# Patient Record
Sex: MALE | Race: WHITE | NOT HISPANIC OR LATINO | Employment: OTHER | URBAN - METROPOLITAN AREA
[De-identification: names, ages, dates, MRNs, and addresses within clinical notes are randomized per-mention and may not be internally consistent; named-entity substitution may affect disease eponyms.]

---

## 2017-11-08 PROBLEM — F31.70 BIPOLAR DISORDER IN FULL REMISSION (HCC): Status: ACTIVE | Noted: 2017-11-08

## 2017-11-08 PROBLEM — I10 ESSENTIAL HYPERTENSION: Status: ACTIVE | Noted: 2017-11-08

## 2017-11-08 PROBLEM — F19.11 HX OF SUBSTANCE ABUSE (HCC): Status: ACTIVE | Noted: 2017-11-08

## 2017-11-08 PROBLEM — E29.1 HYPOGONADISM IN MALE: Status: ACTIVE | Noted: 2017-11-08

## 2018-09-24 ENCOUNTER — HOSPITAL ENCOUNTER (EMERGENCY)
Facility: MEDICAL CENTER | Age: 38
End: 2018-09-24
Attending: EMERGENCY MEDICINE
Payer: MEDICARE

## 2018-09-24 VITALS
RESPIRATION RATE: 16 BRPM | TEMPERATURE: 99.1 F | SYSTOLIC BLOOD PRESSURE: 138 MMHG | DIASTOLIC BLOOD PRESSURE: 84 MMHG | HEART RATE: 99 BPM | HEIGHT: 72 IN | WEIGHT: 187.39 LBS | BODY MASS INDEX: 25.38 KG/M2 | OXYGEN SATURATION: 95 %

## 2018-09-24 DIAGNOSIS — F10.20 ALCOHOLISM (HCC): ICD-10-CM

## 2018-09-24 DIAGNOSIS — F10.930 ALCOHOL WITHDRAWAL SYNDROME WITHOUT COMPLICATION (HCC): ICD-10-CM

## 2018-09-24 PROCEDURE — 700102 HCHG RX REV CODE 250 W/ 637 OVERRIDE(OP): Performed by: EMERGENCY MEDICINE

## 2018-09-24 PROCEDURE — 99284 EMERGENCY DEPT VISIT MOD MDM: CPT

## 2018-09-24 PROCEDURE — A9270 NON-COVERED ITEM OR SERVICE: HCPCS | Performed by: EMERGENCY MEDICINE

## 2018-09-24 RX ORDER — DIAZEPAM 5 MG/1
5 TABLET ORAL ONCE
Status: COMPLETED | OUTPATIENT
Start: 2018-09-24 | End: 2018-09-24

## 2018-09-24 RX ADMIN — DIAZEPAM 5 MG: 5 TABLET ORAL at 15:56

## 2018-09-24 ASSESSMENT — PAIN SCALES - GENERAL
PAINLEVEL_OUTOF10: 0
PAINLEVEL_OUTOF10: 0

## 2018-09-24 NOTE — ED NOTES
Patient care assumed for discharge only.  Pt given d/c instructions and f/u info with verbal understanding.  Pt has [facility] release form in hand and is going to go straight there.  VSS at discharge.  Pt ambulatory from the ED w/ steady gait.  All belongings in possession on discharge.  Pt escorted to the lobby by RN.  '

## 2018-09-24 NOTE — ED PROVIDER NOTES
ED Provider Note    Scribed for Con Herrera M.D. by Doreen Bartlett. 9/24/2018, 3:33 PM.    Primary care provider: Denny Booth P.A.-C.  Means of arrival: Walk-In  History obtained from: Patient  History limited by: None    CHIEF COMPLAINT  Chief Complaint   Patient presents with   • Medical Clearance     Wants to go to [facility]  Ramos Espinosa is a 38 y.o. male who presents to the Emergency Department for medical clearance to detox after testing positive for Benzo's today while at [facility]. Patient was recently admitted to [facility] for 2 weeks and recently discharged on 9/21 after an ICU admission for alcohol related seizure. Patient states he was in a coma for a week and in the ICU for another week following that and discharged on Friday. Associated symptoms include slight tremor since waking up from his Coma.     REVIEW OF SYSTEMS  Pertinent positives include tremor, medical clearance.   Pertinent negatives include no fever, nausea, emesis.       PAST MEDICAL HISTORY   has a past medical history of Allergy; C. difficile diarrhea (6/2006); Hypertension; Opiate dependence (CMS-HCC) ([facility]); and Seizure disorder (CMS-HCC) ([facility]).    SURGICAL HISTORY   has a past surgical history that includes laminotomy (2006).    SOCIAL HISTORY  Social History   Substance Use Topics   • Smoking status: Current Every Day Smoker     Packs/day: 0.50     Types: Cigarettes   • Smokeless tobacco: Never Used   • Alcohol use No      Comment: Denies      History   Drug Use No     Comment: Denies       FAMILY HISTORY  No family history on file.    CURRENT MEDICATIONS  No current facility-administered medications for this encounter.     Current Outpatient Prescriptions:   •  amLODIPine (NORVASC) 10 MG Tab, TAKE 1 TABLET BY MOUTH EVERY DAY, Disp: 90 Tab, Rfl: 1  •  lisinopril (PRINIVIL, ZESTRIL) 40 MG tablet, TAKE 1 TAB BY MOUTH EVERY DAY., Disp: 90 Tab, Rfl: 1  •  clotrimazole-betamethasone (LOTRISONE) 1-0.05  "% Cream, Apply small amount to affected area twice daily, Disp: 30 g, Rfl: 0  •  gabapentin (NEURONTIN) 800 MG tablet, Take 1 Tab by mouth 3 times a day., Disp: 90 Tab, Rfl: 0  •  doxepin (SINEQUAN) 150 MG capsule, Take 1 capsule every evening, Disp: 30 Cap, Rfl: 0  •  B-D 3CC LUER-BRITTANEY SYR 38HK0-5/2 18G X 1-1/2\" 3 ML Misc, USE SYRINGE TO DRAW UP TESTOSTERONE SUSPENSION, Disp: 10 Each, Rfl: 3  •  hydrOXYzine pamoate (VISTARIL) 50 MG Cap, Take 1 capsule 3 times daily, Disp: 90 Cap, Rfl: 3  •  metoprolol SR (TOPROL XL) 100 MG TABLET SR 24 HR, Take 1 tablet daily, Disp: 90 Tab, Rfl: 3  •  NEXIUM 40 MG delayed-release capsule, , Disp: , Rfl:     ALLERGIES  No Known Allergies    PHYSICAL EXAM  VITAL SIGNS: /91   Pulse 98   Temp 37.1 °C (98.7 °F)   Resp 16   Ht 1.829 m (6')   Wt 85 kg (187 lb 6.3 oz)   SpO2 96%   BMI 25.41 kg/m²     Nursing note and vitals reviewed.  Constitutional: Well-developed and well-nourished. No distress. Slightly anxious.   HENT: Head is normocephalic and atraumatic. Oropharynx is clear and moist without exudate or erythema.   Eyes: Pupils are equal, round, and reactive to light. Conjunctiva are normal.   Cardiovascular: Normal rate and regular rhythm. No murmur heard. Normal radial pulses.  Pulmonary/Chest: Breath sounds normal. No wheezes or rales.   Abdominal: Soft and non-tender. No distention    Musculoskeletal: Extremities exhibit normal range of motion without edema or tenderness.   Neurological: Very fine tremor. Awake, alert and oriented to person, place, and time. No focal deficits noted.  Skin: Skin is warm and dry. No rash.   Psychiatric: Normal mood and slightly anxious affect. Appropriate for clinical situation    DIAGNOSTIC STUDIES / PROCEDURES    COURSE & MEDICAL DECISION MAKING  Nursing notes, VS, PMSFHx reviewed in chart.    3:33 PM - Patient seen and examined at bedside. Patient will be treated with Valium.      The patient appears to have a mild alcohol withdrawal " syndrome.  No evidence of significant complication at this time.  He is medically clear for treatment at [facility].    HTN/IDDM FOLLOW UP:  The patient has known hypertension and is being followed by their primary care doctor    DISPOSITION:  Patient will be discharged home in stable condition.    FOLLOW UP:  [facility], Emergency Dept  [address] [id number] [phone]    If symptoms worsen    [facility]    Today        OUTPATIENT MEDICATIONS:  New Prescriptions    No medications on file       The patient was discharged home with an information sheet on alcoholism and told to return immediately for any signs or symptoms listed. The patient agreed to the discharge precautions and follow-up plan which is documented in EPIC.    FINAL IMPRESSION  1. Alcoholism (HCC)    2. Alcohol withdrawal syndrome without complication (HCC)          Doreen BOND (Marinibe), am scribing for, and in the presence of, Con Herrera M.D..    Electronically signed by: Doreen Bartlett (Neto), 9/24/2018    ICon M.D. personally performed the services described in this documentation, as scribed by Doreen Bartlett in my presence, and it is both accurate and complete. E    The note accurately reflects work and decisions made by me.  Con Herrera  9/24/2018  3:48 PM     <<-----Click on this checkbox to enter Family History Family history of diabetes mellitus in paternal grandmother     Father  Still living? Unknown  Family history of hypertension, Age at diagnosis: Age Unknown     Mother  Still living? Yes, Estimated age: Age Unknown  Family history of hypertension, Age at diagnosis: Age Unknown     Grandparent  Still living? Unknown  Family history of hypertension in grandmother, Age at diagnosis: Age Unknown     Aunt  Still living? Unknown  Family history of diabetes mellitus (DM), Age at diagnosis: Age Unknown     Uncle  Still living? Yes, Estimated age: Age Unknown  Family history of diabetes mellitus (DM), Age at diagnosis: Age Unknown

## 2018-09-24 NOTE — ED TRIAGE NOTES
Pt amb to triage.   Chief Complaint   Patient presents with   • Medical Clearance     Wants to go to [facility]     Pt states he was DC'd from [facility] fri 9/21 after an ICU admit for etoh related seizure. Pt presented to [facility] today, tested positive for benzo's, needs doctor clearance that he can detox.    Pt calm and cooperative.

## 2018-10-03 ENCOUNTER — HOSPITAL ENCOUNTER (EMERGENCY)
Facility: MEDICAL CENTER | Age: 38
End: 2018-10-03
Attending: EMERGENCY MEDICINE
Payer: MEDICARE

## 2018-10-03 VITALS
RESPIRATION RATE: 16 BRPM | TEMPERATURE: 97.6 F | SYSTOLIC BLOOD PRESSURE: 125 MMHG | OXYGEN SATURATION: 96 % | HEART RATE: 89 BPM | DIASTOLIC BLOOD PRESSURE: 86 MMHG | WEIGHT: 197 LBS | BODY MASS INDEX: 26.72 KG/M2

## 2018-10-03 DIAGNOSIS — Z76.0 MEDICATION REFILL: ICD-10-CM

## 2018-10-03 DIAGNOSIS — F41.9 ANXIETY: ICD-10-CM

## 2018-10-03 DIAGNOSIS — F10.930 ALCOHOL WITHDRAWAL SYNDROME WITHOUT COMPLICATION (HCC): ICD-10-CM

## 2018-10-03 PROCEDURE — A9270 NON-COVERED ITEM OR SERVICE: HCPCS | Performed by: EMERGENCY MEDICINE

## 2018-10-03 PROCEDURE — 700102 HCHG RX REV CODE 250 W/ 637 OVERRIDE(OP): Performed by: EMERGENCY MEDICINE

## 2018-10-03 PROCEDURE — 99283 EMERGENCY DEPT VISIT LOW MDM: CPT

## 2018-10-03 RX ORDER — ALPRAZOLAM 0.5 MG/1
0.5 TABLET ORAL 3 TIMES DAILY PRN
Qty: 15 TAB | Refills: 0 | Status: SHIPPED | OUTPATIENT
Start: 2018-10-03 | End: 2018-10-08

## 2018-10-03 RX ORDER — ALPRAZOLAM 0.25 MG/1
0.5 TABLET ORAL ONCE
Status: COMPLETED | OUTPATIENT
Start: 2018-10-03 | End: 2018-10-03

## 2018-10-03 RX ADMIN — ALPRAZOLAM 0.5 MG: 0.25 TABLET ORAL at 13:22

## 2018-10-03 ASSESSMENT — PAIN SCALES - GENERAL: PAINLEVEL_OUTOF10: 7

## 2018-10-03 NOTE — ED NOTES
Medicated Pt per MAR. Discharge instructions given to patient, prescriptions provided, a verbal understanding of all instructions was stated.  Pt preferred to walk out accompanied by self VSS,  all belongings accounted for.

## 2018-10-03 NOTE — ED PROVIDER NOTES
"ED Provider Note    CHIEF COMPLAINT  Chief Complaint   Patient presents with   • Medication Refill       HPI  Ramos Espinosa is a 38 y.o. male who presents to the emergency department for medication refill and persistent anxiety.  Patient has a history of alcoholism.  Currently at [facility] for ongoing therapy.  He explains that he was admitted to [facility] earlier in the month for alcohol withdrawal seizure.  He explains that he is since been taken off his benzodiazepine and he is feeling persistently anxious.  He explains that he is currently not getting any medication for his \"withdrawal \".  He also notes that he does have a follow-up appoint with his primary care physician however this is not until December.  He is currently getting the rest of his medications without difficulty.    REVIEW OF SYSTEMS  See HPI for further details. All other systems are negative.     PAST MEDICAL HISTORY   has a past medical history of Allergy; C. difficile diarrhea (6/2006); Hypertension; Opiate dependence ([facility]); and Seizure disorder ([facility]).    SOCIAL HISTORY  Social History     Social History Main Topics   • Smoking status: Current Every Day Smoker     Packs/day: 0.50     Types: Cigarettes   • Smokeless tobacco: Never Used   • Alcohol use No      Comment: Denies   • Drug use: No      Comment: Denies   • Sexual activity: Not on file       SURGICAL HISTORY   has a past surgical history that includes laminotomy (2006).    CURRENT MEDICATIONS  Home Medications     Reviewed by Marcelle Martinez R.N. (Registered Nurse) on 10/03/18 at 1250  Med List Status: Partial   Medication Last Dose Status   amLODIPine (NORVASC) 10 MG Tab  Active   B-D 3CC LUER-BRITTANEY SYR 13YE0-7/2 18G X 1-1/2\" 3 ML Misc 3/20/2018 Active   clotrimazole-betamethasone (LOTRISONE) 1-0.05 % Cream  Active   doxepin (SINEQUAN) 150 MG capsule  Active   gabapentin (NEURONTIN) 800 MG tablet 10/3/2018 Active   hydrOXYzine pamoate (VISTARIL) 50 MG Cap  Active "   lisinopril (PRINIVIL, ZESTRIL) 40 MG tablet  Active   metoprolol SR (TOPROL XL) 100 MG TABLET SR 24 HR  Active   NEXIUM 40 MG delayed-release capsule  Active                ALLERGIES  No Known Allergies    PHYSICAL EXAM  VITAL SIGNS: /86   Pulse 89   Temp 36.4 °C (97.6 °F) (Temporal)   Resp 16   Wt 89.4 kg (197 lb)   SpO2 96%   BMI 26.72 kg/m²  @AMARILYS[[id number]::@  Pulse ox interpretation: I interpret this pulse ox as normal.  Constitutional: Alert in no apparent distress.  HENT: Normocephalic, Atraumatic, Bilateral external ears normal. Nose normal.   Eyes: Pupils are equal and reactive. Conjunctiva normal, non-icteric.   Heart: Regular rate and rythm, no murmurs.    Lungs: Clear to auscultation bilaterally.  Skin: Warm, Dry, No erythema, No rash.   Neurologic: Alert, Grossly non-focal.   Psychiatric: Judgment normal, anxious, Appears appropriate and not intoxicated.           COURSE & MEDICAL DECISION MAKING  Pertinent Labs & Imaging studies reviewed. (See chart for details)  Patient presented to the emergency department for medication refill.  Patient has persistent anxiety and is currently undergoing alcohol withdrawal therapy.  He does not have any benzodiazepines ordered.  Sent from [facility] for further medication refill.  Previously on Xanax.  I will refill this for the next 5 days.  He is referred to primary care physician as well as local clinic for outpatient follow-up and reevaluation.    The patient will not drink alcohol nor drive with prescribed medications. The patient will return for worsening symptoms and is stable at the time of discharge. The patient verbalizes understanding and will comply.    FINAL IMPRESSION  1. Medication refill    2. Anxiety    3. Alcohol withdrawal syndrome without complication (HCC)               Electronically signed by: Rey Casanova, 10/3/2018 1:17 PM

## 2018-10-03 NOTE — ED TRIAGE NOTES
Pt ambulates to triage  Chief Complaint   Patient presents with   • Medication Refill   out of gabapentin, [facility] takes 800 mg TID but used to be on 1600 mg TID.  Pt asked to wait in lobby, pt updated on triage process and pt asked to inform RN of any changes.